# Patient Record
Sex: FEMALE | Race: WHITE | NOT HISPANIC OR LATINO | Employment: FULL TIME | URBAN - METROPOLITAN AREA
[De-identification: names, ages, dates, MRNs, and addresses within clinical notes are randomized per-mention and may not be internally consistent; named-entity substitution may affect disease eponyms.]

---

## 2024-08-19 ENCOUNTER — HOSPITAL ENCOUNTER (OUTPATIENT)
Dept: MAMMOGRAPHY | Facility: HOSPITAL | Age: 44
Discharge: HOME | End: 2024-08-19
Admitting: OBSTETRICS & GYNECOLOGY
Payer: COMMERCIAL

## 2024-08-19 DIAGNOSIS — Z12.31 VISIT FOR SCREENING MAMMOGRAM: ICD-10-CM

## 2024-08-19 LAB
NCCN CRITERIA FLAG: NORMAL
TYRER CUZICK SCORE: 13.1

## 2024-08-19 PROCEDURE — 77067 SCR MAMMO BI INCL CAD: CPT

## 2024-08-19 PROCEDURE — 77063 BREAST TOMOSYNTHESIS BI: CPT

## 2024-08-27 ENCOUNTER — LAB (OUTPATIENT)
Dept: LAB | Facility: HOSPITAL | Age: 44
End: 2024-08-27
Payer: COMMERCIAL

## 2024-08-27 DIAGNOSIS — R73.01 IMPAIRED FASTING GLUCOSE: Chronic | ICD-10-CM

## 2024-08-27 DIAGNOSIS — E78.00 PURE HYPERCHOLESTEROLEMIA: Chronic | ICD-10-CM

## 2024-08-27 PROCEDURE — 80061 LIPID PANEL: CPT

## 2024-08-27 PROCEDURE — 83036 HEMOGLOBIN GLYCOSYLATED A1C: CPT

## 2024-08-28 LAB
CHOLEST SERPL-MCNC: 193 MG/DL (ref 0–200)
HBA1C MFR BLD: 5.3 % (ref 4.8–5.6)
HDLC SERPL-MCNC: 64 MG/DL (ref 40–60)
LDLC SERPL CALC-MCNC: 108 MG/DL (ref 0–100)
LDLC/HDLC SERPL: 1.65 {RATIO}
TRIGL SERPL-MCNC: 117 MG/DL (ref 0–150)
VLDLC SERPL-MCNC: 21 MG/DL (ref 5–40)

## 2024-09-08 NOTE — PROGRESS NOTES
"Chief Complaint   Patient presents with    Hyperlipidemia    Impaired fasting glucose       History of Present Illness  44 y.o.  female presents for f/u on sugars and cholesterol.    Inquiring about repeat abnl mammograms and need for dx mammograms. Denies FH of breast CA.    Inquiring about menopause and HRT.  Currently doing well with OCPs with history of cysts.  Reports that mother went through early menopause at age 45.  Likely exacerbated by stress.    Patient reports feeling very hot at nighttime.    Review of Systems  Denies CP, SOB, palpitations. ROS (+) for nighttime heat, no sweating. All other ROS reviewed and negative.    Current Outpatient Medications:     albuterol sulfate  (90 Base) MCG/ACT inhaler prn    cetirizine (zyrTEC) 10 MG QD    desogestrel-ethinyl estradiol (Viorele) 0.15-0.02/0.01 MG (21/5) QD    VITALS:  /76   Pulse 51   Ht 163.2 cm (64.25\")   Wt 73.8 kg (162 lb 9.6 oz)   LMP 07/29/2024 Comment: denies pregnancy  SpO2 98%   BMI 27.69 kg/m²     Physical Exam  Vitals and nursing note reviewed.   Constitutional:       General: She is not in acute distress.     Appearance: Normal appearance. She is not ill-appearing.   Eyes:      Extraocular Movements: Extraocular movements intact.      Conjunctiva/sclera: Conjunctivae normal.   Pulmonary:      Effort: Pulmonary effort is normal. No respiratory distress.   Neurological:      Mental Status: She is alert. Mental status is at baseline.   Psychiatric:         Mood and Affect: Mood normal.         Behavior: Behavior normal.         LABS  Results for orders placed or performed in visit on 08/27/24   Lipid Panel    Specimen: Blood   Result Value Ref Range    Total Cholesterol 193 0 - 200 mg/dL    Triglycerides 117 0 - 150 mg/dL    HDL Cholesterol 64 (H) 40 - 60 mg/dL    LDL Cholesterol  108 (H) 0 - 100 mg/dL    VLDL Cholesterol 21 5 - 40 mg/dL    LDL/HDL Ratio 1.65    Hemoglobin A1c    Specimen: Blood   Result Value Ref " Range    Hemoglobin A1C 5.30 4.80 - 5.60 %     4/30/24 A1C 5.6,     8/19/24 mammo -fibroglandular breast tissue, asymmetry lateral right breast    ASSESSMENT/PLAN    Diagnoses and all orders for this visit:    1. Impaired fasting glucose (Primary)  Assessment & Plan:  BG control stable/good with A1C 5.3; will repeat with next PE in 7 mos      2. Hyperlipidemia  Assessment & Plan:   Lipids improved, back at goal with ; goal LDL < 130, ideally < 100; no meds      3. Vaccine counseling  Comments:  rec flu vacc and COVID19 vacc in Sept/Oct    4. Abnormal mammogram  Comments:  reviewed findings; pt recommended to obtain f/u views    5. Encounter for medication counseling  Comments:  note mother had early menopause at 45; pt asx except feeling hot at nighttime; discussed could d/c OCP x 1 month and then check labs; cont OCP for now        FOLLOW-UP  Health maintenance -  rec COVID19 vacc and flu vacc - pt states she will get both at school; for breast cancer screening, with no family history and no other significant risk, she could consider obtaining them every 2 years  RTC prn; next PE scheduled 5/5/25; fasting labs prior to appt (CBC, CMP, TSH, lipids, UA/micr, A1C) + FSH and estradiol if patient requests and stopped OCP x 1 month (with other protection recommended)    Electronically signed by:    Virgie Johnson MD, FACP  09/10/2024    EMR Dragon/Transcription Utilization  Please note that portions of this note were completed with a voice recognition program.

## 2024-09-10 ENCOUNTER — OFFICE VISIT (OUTPATIENT)
Dept: INTERNAL MEDICINE | Facility: CLINIC | Age: 44
End: 2024-09-10
Payer: COMMERCIAL

## 2024-09-10 VITALS
DIASTOLIC BLOOD PRESSURE: 76 MMHG | HEIGHT: 64 IN | SYSTOLIC BLOOD PRESSURE: 110 MMHG | BODY MASS INDEX: 27.76 KG/M2 | OXYGEN SATURATION: 98 % | HEART RATE: 51 BPM | WEIGHT: 162.6 LBS

## 2024-09-10 DIAGNOSIS — R92.8 ABNORMAL MAMMOGRAM: ICD-10-CM

## 2024-09-10 DIAGNOSIS — Z71.85 VACCINE COUNSELING: ICD-10-CM

## 2024-09-10 DIAGNOSIS — E78.00 PURE HYPERCHOLESTEROLEMIA: Chronic | ICD-10-CM

## 2024-09-10 DIAGNOSIS — Z71.89 ENCOUNTER FOR MEDICATION COUNSELING: ICD-10-CM

## 2024-09-10 DIAGNOSIS — R73.01 IMPAIRED FASTING GLUCOSE: Primary | Chronic | ICD-10-CM

## 2024-09-10 PROCEDURE — 99214 OFFICE O/P EST MOD 30 MIN: CPT | Performed by: INTERNAL MEDICINE

## 2024-09-17 ENCOUNTER — HOSPITAL ENCOUNTER (OUTPATIENT)
Facility: HOSPITAL | Age: 44
Discharge: HOME | End: 2024-09-17
Admitting: RADIOLOGY
Payer: COMMERCIAL

## 2024-09-17 DIAGNOSIS — R92.8 ABNORMAL MAMMOGRAM: ICD-10-CM

## 2024-09-17 PROCEDURE — G0279 TOMOSYNTHESIS, MAMMO: HCPCS

## 2024-09-17 PROCEDURE — 77065 DX MAMMO INCL CAD UNI: CPT | Performed by: RADIOLOGY

## 2024-09-17 PROCEDURE — 77065 DX MAMMO INCL CAD UNI: CPT

## 2024-09-17 PROCEDURE — 77061 BREAST TOMOSYNTHESIS UNI: CPT | Performed by: RADIOLOGY

## 2024-11-25 RX ORDER — DESOGESTREL AND ETHINYL ESTRADIOL AND ETHINYL ESTRADIOL 21-5 (28)
1 KIT ORAL DAILY
Qty: 28 TABLET | OUTPATIENT
Start: 2024-11-25

## 2025-04-10 DIAGNOSIS — R73.01 IMPAIRED FASTING GLUCOSE: Chronic | ICD-10-CM

## 2025-04-10 DIAGNOSIS — Z00.00 PE (PHYSICAL EXAM), ROUTINE: Primary | Chronic | ICD-10-CM

## 2025-04-25 PROBLEM — I83.93 SPIDER VEINS OF BOTH LOWER EXTREMITIES: Status: ACTIVE | Noted: 2025-04-25

## 2025-04-25 PROBLEM — L91.8 SKIN TAG: Status: ACTIVE | Noted: 2025-04-25

## 2025-04-30 ENCOUNTER — LAB (OUTPATIENT)
Dept: LAB | Facility: HOSPITAL | Age: 45
End: 2025-04-30
Payer: COMMERCIAL

## 2025-04-30 DIAGNOSIS — Z00.00 PE (PHYSICAL EXAM), ROUTINE: ICD-10-CM

## 2025-04-30 DIAGNOSIS — R73.01 IMPAIRED FASTING GLUCOSE: Chronic | ICD-10-CM

## 2025-04-30 LAB
ALBUMIN SERPL-MCNC: 4.2 G/DL (ref 3.5–5.2)
ALBUMIN/GLOB SERPL: 1.6 G/DL
ALP SERPL-CCNC: 64 U/L (ref 39–117)
ALT SERPL W P-5'-P-CCNC: 17 U/L (ref 1–33)
ANION GAP SERPL CALCULATED.3IONS-SCNC: 11.3 MMOL/L (ref 5–15)
AST SERPL-CCNC: 25 U/L (ref 1–32)
BACTERIA UR QL AUTO: ABNORMAL /HPF
BASOPHILS # BLD AUTO: 0.04 10*3/MM3 (ref 0–0.2)
BASOPHILS NFR BLD AUTO: 0.9 % (ref 0–1.5)
BILIRUB SERPL-MCNC: 0.3 MG/DL (ref 0–1.2)
BILIRUB UR QL STRIP: NEGATIVE
BUN SERPL-MCNC: 13 MG/DL (ref 6–20)
BUN/CREAT SERPL: 16.5 (ref 7–25)
CALCIUM SPEC-SCNC: 9.4 MG/DL (ref 8.6–10.5)
CHLORIDE SERPL-SCNC: 104 MMOL/L (ref 98–107)
CHOLEST SERPL-MCNC: 235 MG/DL (ref 0–200)
CLARITY UR: CLEAR
CO2 SERPL-SCNC: 23.7 MMOL/L (ref 22–29)
COLOR UR: YELLOW
CREAT SERPL-MCNC: 0.79 MG/DL (ref 0.57–1)
DEPRECATED RDW RBC AUTO: 36.6 FL (ref 37–54)
EGFRCR SERPLBLD CKD-EPI 2021: 94.7 ML/MIN/1.73
EOSINOPHIL # BLD AUTO: 0.18 10*3/MM3 (ref 0–0.4)
EOSINOPHIL NFR BLD AUTO: 4.2 % (ref 0.3–6.2)
ERYTHROCYTE [DISTWIDTH] IN BLOOD BY AUTOMATED COUNT: 12 % (ref 12.3–15.4)
GLOBULIN UR ELPH-MCNC: 2.7 GM/DL
GLUCOSE SERPL-MCNC: 98 MG/DL (ref 65–99)
GLUCOSE UR STRIP-MCNC: NEGATIVE MG/DL
HBA1C MFR BLD: 5.7 % (ref 4.8–5.6)
HCT VFR BLD AUTO: 43.5 % (ref 34–46.6)
HDLC SERPL-MCNC: 79 MG/DL (ref 40–60)
HGB BLD-MCNC: 14.6 G/DL (ref 12–15.9)
HGB UR QL STRIP.AUTO: NEGATIVE
HYALINE CASTS UR QL AUTO: ABNORMAL /LPF
IMM GRANULOCYTES # BLD AUTO: 0.01 10*3/MM3 (ref 0–0.05)
IMM GRANULOCYTES NFR BLD AUTO: 0.2 % (ref 0–0.5)
KETONES UR QL STRIP: NEGATIVE
LDLC SERPL CALC-MCNC: 137 MG/DL (ref 0–100)
LDLC/HDLC SERPL: 1.69 {RATIO}
LEUKOCYTE ESTERASE UR QL STRIP.AUTO: ABNORMAL
LYMPHOCYTES # BLD AUTO: 1.43 10*3/MM3 (ref 0.7–3.1)
LYMPHOCYTES NFR BLD AUTO: 33.6 % (ref 19.6–45.3)
MCH RBC QN AUTO: 28.6 PG (ref 26.6–33)
MCHC RBC AUTO-ENTMCNC: 33.6 G/DL (ref 31.5–35.7)
MCV RBC AUTO: 85.1 FL (ref 79–97)
MONOCYTES # BLD AUTO: 0.47 10*3/MM3 (ref 0.1–0.9)
MONOCYTES NFR BLD AUTO: 11 % (ref 5–12)
NEUTROPHILS NFR BLD AUTO: 2.13 10*3/MM3 (ref 1.7–7)
NEUTROPHILS NFR BLD AUTO: 50.1 % (ref 42.7–76)
NITRITE UR QL STRIP: NEGATIVE
NRBC BLD AUTO-RTO: 0 /100 WBC (ref 0–0.2)
PH UR STRIP.AUTO: 6.5 [PH] (ref 5–8)
PLATELET # BLD AUTO: 246 10*3/MM3 (ref 140–450)
PMV BLD AUTO: 11 FL (ref 6–12)
POTASSIUM SERPL-SCNC: 4.3 MMOL/L (ref 3.5–5.2)
PROT SERPL-MCNC: 6.9 G/DL (ref 6–8.5)
PROT UR QL STRIP: NEGATIVE
RBC # BLD AUTO: 5.11 10*6/MM3 (ref 3.77–5.28)
RBC # UR STRIP: ABNORMAL /HPF
REF LAB TEST METHOD: ABNORMAL
SODIUM SERPL-SCNC: 139 MMOL/L (ref 136–145)
SP GR UR STRIP: 1.01 (ref 1–1.03)
SQUAMOUS #/AREA URNS HPF: ABNORMAL /HPF
TRIGL SERPL-MCNC: 112 MG/DL (ref 0–150)
TSH SERPL DL<=0.05 MIU/L-ACNC: 3.54 UIU/ML (ref 0.27–4.2)
UROBILINOGEN UR QL STRIP: ABNORMAL
VLDLC SERPL-MCNC: 19 MG/DL (ref 5–40)
WBC # UR STRIP: ABNORMAL /HPF
WBC NRBC COR # BLD AUTO: 4.26 10*3/MM3 (ref 3.4–10.8)

## 2025-04-30 PROCEDURE — 80050 GENERAL HEALTH PANEL: CPT

## 2025-04-30 PROCEDURE — 81001 URINALYSIS AUTO W/SCOPE: CPT

## 2025-04-30 PROCEDURE — 80061 LIPID PANEL: CPT

## 2025-04-30 PROCEDURE — 83036 HEMOGLOBIN GLYCOSYLATED A1C: CPT

## 2025-05-03 NOTE — PROGRESS NOTES
"Chief Complaint   Patient presents with    Annual Exam    Hyperlipidemia    Impaired fasting glucose       History of Present Illness  45 y.o.  female presents for updated phys examination and f/u on sugars and cholesterol.    Reports mother had early menopause in early 40s. Patient has some breakthrough spotting on OCP. Also notes weight gain.    Sees retinal specialist for monitoring of choroidal drusen; no loss of vision. Also sees regular eye doctor.    Review of Systems  Denies headaches, visual changes, CP, palpitations, SOB, cough, abd pain, n/v/d, difficulty with urination, numbness/tingling, falls, mood changes, lightheadedness, hearing changes, rashes.  Denies vaginal discharge or bleeding (reg periods with OCP with occ spotting) or breast concerns.   All other ROS reviewed and negative.    Current Outpatient Medications:     albuterol sulfate  (90 Base) MCG/ACT inhaler prn    cetirizine 10 MG QD    desogestrel-ethinyl estradiol (Viorele) 0.15-0.02/0.01 MG (21/5) QD    VITALS:  /76   Pulse 78   Ht 163.2 cm (64.25\")   Wt 77 kg (169 lb 12.8 oz)   SpO2 98%   BMI 28.92 kg/m²     Physical Exam  Vitals and nursing note reviewed.   Constitutional:       General: She is not in acute distress.     Appearance: Normal appearance. She is well-developed. She is not ill-appearing.      Comments: Up 7 lbs over last 8 mos   HENT:      Head: Normocephalic.      Right Ear: Tympanic membrane, ear canal and external ear normal.      Left Ear: Tympanic membrane, ear canal and external ear normal.      Nose: Nose normal.   Eyes:      Extraocular Movements: Extraocular movements intact.      Conjunctiva/sclera: Conjunctivae normal.      Pupils: Pupils are equal, round, and reactive to light.   Neck:      Vascular: No carotid bruit (bilaterally).   Cardiovascular:      Rate and Rhythm: Normal rate and regular rhythm.      Heart sounds: Normal heart sounds.   Pulmonary:      Effort: Pulmonary effort is " normal. No respiratory distress.      Breath sounds: Normal breath sounds. No wheezing, rhonchi or rales.   Abdominal:      General: Bowel sounds are normal. There is no distension.      Palpations: Abdomen is soft. There is no mass.      Tenderness: There is no abdominal tenderness.   Genitourinary:     Comments: Breast/pelvic exams deferred to GYN    Musculoskeletal:      Cervical back: Normal range of motion and neck supple.      Right lower leg: No edema.      Left lower leg: No edema.   Lymphadenopathy:      Cervical: No cervical adenopathy.   Skin:     General: Skin is warm and dry.      Findings: No rash.   Neurological:      Mental Status: She is alert and oriented to person, place, and time. Mental status is at baseline.      Gait: Gait normal.   Psychiatric:         Mood and Affect: Mood normal.         Behavior: Behavior normal.         LABS  Results for orders placed or performed in visit on 04/30/25   Comprehensive Metabolic Panel    Collection Time: 04/30/25  8:16 AM    Specimen: Blood   Result Value Ref Range    Glucose 98 65 - 99 mg/dL    BUN 13 6 - 20 mg/dL    Creatinine 0.79 0.57 - 1.00 mg/dL    Sodium 139 136 - 145 mmol/L    Potassium 4.3 3.5 - 5.2 mmol/L    Chloride 104 98 - 107 mmol/L    CO2 23.7 22.0 - 29.0 mmol/L    Calcium 9.4 8.6 - 10.5 mg/dL    Total Protein 6.9 6.0 - 8.5 g/dL    Albumin 4.2 3.5 - 5.2 g/dL    ALT (SGPT) 17 1 - 33 U/L    AST (SGOT) 25 1 - 32 U/L    Alkaline Phosphatase 64 39 - 117 U/L    Total Bilirubin 0.3 0.0 - 1.2 mg/dL    Globulin 2.7 gm/dL    A/G Ratio 1.6 g/dL    BUN/Creatinine Ratio 16.5 7.0 - 25.0    Anion Gap 11.3 5.0 - 15.0 mmol/L    eGFR 94.7 >60.0 mL/min/1.73   Lipid Panel    Collection Time: 04/30/25  8:16 AM    Specimen: Blood   Result Value Ref Range    Total Cholesterol 235 (H) 0 - 200 mg/dL    Triglycerides 112 0 - 150 mg/dL    HDL Cholesterol 79 (H) 40 - 60 mg/dL    LDL Cholesterol  137 (H) 0 - 100 mg/dL    VLDL Cholesterol 19 5 - 40 mg/dL    LDL/HDL Ratio  1.69    TSH    Collection Time: 04/30/25  8:16 AM    Specimen: Blood   Result Value Ref Range    TSH 3.540 0.270 - 4.200 uIU/mL   Hemoglobin A1c    Collection Time: 04/30/25  8:16 AM    Specimen: Blood   Result Value Ref Range    Hemoglobin A1C 5.70 (H) 4.80 - 5.60 %   CBC Auto Differential    Collection Time: 04/30/25  8:16 AM    Specimen: Blood   Result Value Ref Range    WBC 4.26 3.40 - 10.80 10*3/mm3    RBC 5.11 3.77 - 5.28 10*6/mm3    Hemoglobin 14.6 12.0 - 15.9 g/dL    Hematocrit 43.5 34.0 - 46.6 %    MCV 85.1 79.0 - 97.0 fL    MCH 28.6 26.6 - 33.0 pg    MCHC 33.6 31.5 - 35.7 g/dL    RDW 12.0 (L) 12.3 - 15.4 %    RDW-SD 36.6 (L) 37.0 - 54.0 fl    MPV 11.0 6.0 - 12.0 fL    Platelets 246 140 - 450 10*3/mm3    Neutrophil % 50.1 42.7 - 76.0 %    Lymphocyte % 33.6 19.6 - 45.3 %    Monocyte % 11.0 5.0 - 12.0 %    Eosinophil % 4.2 0.3 - 6.2 %    Basophil % 0.9 0.0 - 1.5 %    Immature Grans % 0.2 0.0 - 0.5 %    Neutrophils, Absolute 2.13 1.70 - 7.00 10*3/mm3    Lymphocytes, Absolute 1.43 0.70 - 3.10 10*3/mm3    Monocytes, Absolute 0.47 0.10 - 0.90 10*3/mm3    Eosinophils, Absolute 0.18 0.00 - 0.40 10*3/mm3    Basophils, Absolute 0.04 0.00 - 0.20 10*3/mm3    Immature Grans, Absolute 0.01 0.00 - 0.05 10*3/mm3    nRBC 0.0 0.0 - 0.2 /100 WBC   Urinalysis without microscopic (no culture) - Urine, Clean Catch    Collection Time: 04/30/25  8:16 AM    Specimen: Urine, Clean Catch   Result Value Ref Range    Color, UA Yellow Yellow, Straw    Appearance, UA Clear Clear    pH, UA 6.5 5.0 - 8.0    Specific Gravity, UA 1.010 1.005 - 1.030    Glucose, UA Negative Negative    Ketones, UA Negative Negative    Bilirubin, UA Negative Negative    Blood, UA Negative Negative    Protein, UA Negative Negative    Leuk Esterase, UA Trace (A) Negative    Nitrite, UA Negative Negative    Urobilinogen, UA 0.2 E.U./dL 0.2 - 1.0 E.U./dL   Urinalysis, Microscopic Only - Urine, Clean Catch    Collection Time: 04/30/25  8:16 AM    Specimen: Urine,  Clean Catch   Result Value Ref Range    RBC, UA None Seen None Seen, 0-2 /HPF    WBC, UA 0-2 None Seen, 0-2 /HPF    Bacteria, UA Trace (A) None Seen /HPF    Squamous Epithelial Cells, UA 3-6 (A) None Seen, 0-2 /HPF    Hyaline Casts, UA None Seen None Seen /LPF    Methodology Automated Microscopy      8/27/24 A1C 5.3,       ECG 12 Lead    Date/Time: 5/5/2025 9:03 AM  Performed by: Virgie Johnson MD    Authorized by: Virgie Johnson MD  Comparison: compared with previous ECG from 5/2/2024  Similar to previous ECG  Rhythm: sinus rhythm and sinus arrhythmia  Rate: normal  BPM: 60  Conduction: conduction normal  ST Segments: ST segments normal  T Waves: T waves normal  QRS axis: normal  Clinical impression comment: stable EKG        ASSESSMENT/PLAN    Diagnoses and all orders for this visit:    1. PE (physical exam), routine (Primary)  Assessment & Plan:  Health maintenance - refuses COVID19 vacc; flu vacc at school; Prev 20 8/22; Td 8/20 (next Tdap due 2030); HAV done; mammo 8/19/24, f/u R 9/24, resume screening; GYN w/ Dr. Mann pending 5/6/25 (Pap 4/22 - advised she needs Pap this year) - pt is going to reschedule since menstrual cycle is scheduled to begin tomorrow; DEXA with menopause; screening colonosc at age 46; eye exam UTD with regular opto and retinal specialist for choroidal drusen; dental exam q6 mos; (+) seat belt use    Consultants:  Patient Care Team:  Virgie Johnson MD as PCP - General (Internal Medicine)  Zachary Schmidt MD as Consulting Physician (Allergy)  Jessica Linares PA as Physician Assistant (Dermatology)  Michele Cruz MD as Consulting Physician (Retina Ophthalmology)  Madisyn Mann MD as Consulting Physician (Obstetrics and Gynecology)  Yola Merrill PA as Physician Assistant (Dermatology)        2. Hyperlipidemia  Assessment & Plan:  Lipids worsened with ; decrease saturated fats and cholesterol in the diet; repeat lipids in 6 mos      Orders:  -      Lipid Panel; Future  -     ECG 12 Lead    3. Impaired fasting glucose  Assessment & Plan:  BG control bord with A1C 5.7; encouraged reg phys activity to decr insulin resistance, moderation in unhealthy starches/sweets; f/u A1C in 6 mos      Orders:  -     Hemoglobin A1c; Future    4. Overweight (BMI 25.0-29.9)  Assessment & Plan:  Patient's (Body mass index is 28.92 kg/m².) with 7 lbs wt gain over the last 8mos and 25 lbs over the last 10 yrs; rec evaluating daily caloric intake (probably should be 1150-8957 lety/day), minimizing snacking and beverages between meals, not eating late at nighttime, and maintaining balance of protein with carbs with each meal or snack. Also reviewed pros/cons and options with intermittent fasting. Also discussed importance of consistent exercise, max intensity of exercise. Advised more cardio type of exercise initially and then balance with resistance exercises to help maintain successful wt loss. Reviewed role of mediations, incl benefits, risks, side effects. Would need exercise plan and would need to look at insurance coverage. She would be covered with BMI > 27 (28.92) with co-morbidity (hyperlipidemia)        Time Documentation    Counseled patient  I spent  61 minutes  face to face and 10 minutes non-face to face on today's office visit. Time was spent reviewing patient's previous notes, lab results, test results, vitals, and/or other records as well as examining the patient, ordering tests/medicines/procedures, providing counseling, coordinating care, answering questions, discussing evaluation and treatment plans, and writing this note.     Time spent performing and reading the EKG was not included in the time reported for the E/M service for this visit.    I spent 20 minutes on the separately reported service of [id number]. This time is not included in the time used to support the E/M service also reported today.     Total time: 71 minutes  (Level 5 40 minutes)     FOLLOW-UP  RTC 4  mos with A1C, lipids (escribed)    Electronically signed by:    Virgie Johnson MD, FACP  05/05/2025

## 2025-05-03 NOTE — ASSESSMENT & PLAN NOTE
Lipids worsened with ; decrease saturated fats and cholesterol in the diet; repeat lipids in 6 mos

## 2025-05-03 NOTE — ASSESSMENT & PLAN NOTE
Health maintenance - refuses COVID19 vacc; flu vacc at school; Prev 20 8/22; Td 8/20 (next Tdap due 2030); HAV done; mammo 8/19/24, f/u R 9/24, resume screening; GYN w/ Dr. Mann pending 5/6/25 (Pap 4/22 - advised she needs Pap this year) - pt is going to reschedule since menstrual cycle is scheduled to begin tomorrow; DEXA with menopause; screening colonosc at age 46; eye exam UTD with regular opto and retinal specialist for choroidal drusen; dental exam q6 mos; (+) seat belt use    Consultants:  Patient Care Team:  Virgie Johnson MD as PCP - General (Internal Medicine)  Zachary Schmidt MD as Consulting Physician (Allergy)  Jessica Linares PA as Physician Assistant (Dermatology)  Michele Cruz MD as Consulting Physician (Retina Ophthalmology)  Madisyn Mann MD as Consulting Physician (Obstetrics and Gynecology)  Yola Merrill PA as Physician Assistant (Dermatology)

## 2025-05-05 ENCOUNTER — OFFICE VISIT (OUTPATIENT)
Dept: INTERNAL MEDICINE | Facility: CLINIC | Age: 45
End: 2025-05-05
Payer: COMMERCIAL

## 2025-05-05 ENCOUNTER — TELEPHONE (OUTPATIENT)
Dept: OBSTETRICS AND GYNECOLOGY | Facility: CLINIC | Age: 45
End: 2025-05-05
Payer: COMMERCIAL

## 2025-05-05 VITALS
HEIGHT: 64 IN | OXYGEN SATURATION: 98 % | SYSTOLIC BLOOD PRESSURE: 128 MMHG | HEART RATE: 78 BPM | DIASTOLIC BLOOD PRESSURE: 76 MMHG | WEIGHT: 169.8 LBS | BODY MASS INDEX: 28.99 KG/M2

## 2025-05-05 DIAGNOSIS — R73.01 IMPAIRED FASTING GLUCOSE: Chronic | ICD-10-CM

## 2025-05-05 DIAGNOSIS — Z00.00 PE (PHYSICAL EXAM), ROUTINE: Primary | ICD-10-CM

## 2025-05-05 DIAGNOSIS — E78.00 PURE HYPERCHOLESTEROLEMIA: Chronic | ICD-10-CM

## 2025-05-05 DIAGNOSIS — E66.3 OVERWEIGHT (BMI 25.0-29.9): Chronic | ICD-10-CM

## 2025-05-05 NOTE — TELEPHONE ENCOUNTER
"  Hub staff attempted to follow warm transfer process and was unsuccessful     Caller: Shelly Fall \"Christel\"    Relationship to patient: Self    Best call back number: [phone] (home)       Patient is needing: A CALL BACK ABOUT TOMORROW'S APPT, HAS ANNUAL AND AND ULTRASOUND APPT WITH DR TALBERT. SHE SHOULD START HER PERIOD TOMORROW, NORMALLY IT IS PRETTY LIGHT. SHOULD SHE RESCHEDULE?          "

## 2025-05-06 NOTE — ASSESSMENT & PLAN NOTE
Patient's (Body mass index is 28.92 kg/m².) with 7 lbs wt gain over the last 8mos and 25 lbs over the last 10 yrs; rec evaluating daily caloric intake (probably should be 1468-1581 lety/day), minimizing snacking and beverages between meals, not eating late at nighttime, and maintaining balance of protein with carbs with each meal or snack. Also reviewed pros/cons and options with intermittent fasting. Also discussed importance of consistent exercise, max intensity of exercise. Advised more cardio type of exercise initially and then balance with resistance exercises to help maintain successful wt loss. Reviewed role of mediations, incl benefits, risks, side effects. Would need exercise plan and would need to look at insurance coverage. She would be covered with BMI > 27 (28.92) with co-morbidity (hyperlipidemia); > 20 minutes spent counseling

## 2025-05-07 RX ORDER — DESOGESTREL AND ETHINYL ESTRADIOL AND ETHINYL ESTRADIOL 21-5 (28)
1 KIT ORAL DAILY
Qty: 84 TABLET | Refills: 3 | Status: SHIPPED | OUTPATIENT
Start: 2025-05-07

## 2025-06-10 DIAGNOSIS — D21.9 FIBROIDS: Primary | ICD-10-CM

## 2025-06-11 ENCOUNTER — OFFICE VISIT (OUTPATIENT)
Dept: OBSTETRICS AND GYNECOLOGY | Facility: CLINIC | Age: 45
End: 2025-06-11
Payer: COMMERCIAL

## 2025-06-11 ENCOUNTER — TELEPHONE (OUTPATIENT)
Dept: OBSTETRICS AND GYNECOLOGY | Facility: CLINIC | Age: 45
End: 2025-06-11

## 2025-06-11 VITALS
SYSTOLIC BLOOD PRESSURE: 122 MMHG | BODY MASS INDEX: 29.57 KG/M2 | WEIGHT: 173.2 LBS | DIASTOLIC BLOOD PRESSURE: 80 MMHG | HEIGHT: 64 IN

## 2025-06-11 DIAGNOSIS — Z01.419 WOMEN'S ANNUAL ROUTINE GYNECOLOGICAL EXAMINATION: Primary | ICD-10-CM

## 2025-06-11 DIAGNOSIS — N83.202 CYST OF LEFT OVARY: ICD-10-CM

## 2025-06-11 DIAGNOSIS — D25.1 INTRAMURAL LEIOMYOMA OF UTERUS: ICD-10-CM

## 2025-06-11 DIAGNOSIS — Z12.39 ENCOUNTER FOR BREAST CANCER SCREENING USING NON-MAMMOGRAM MODALITY: ICD-10-CM

## 2025-06-11 DIAGNOSIS — E66.3 OVERWEIGHT (BMI 25.0-29.9): ICD-10-CM

## 2025-06-11 DIAGNOSIS — Z30.41 ENCOUNTER FOR SURVEILLANCE OF CONTRACEPTIVE PILLS: ICD-10-CM

## 2025-06-11 DIAGNOSIS — E78.00 HYPERCHOLESTEROLEMIA: ICD-10-CM

## 2025-06-11 RX ORDER — SEMAGLUTIDE 0.25 MG/.5ML
0.25 INJECTION, SOLUTION SUBCUTANEOUS WEEKLY
Qty: 2 ML | Refills: 2 | Status: SHIPPED | OUTPATIENT
Start: 2025-06-11

## 2025-06-11 NOTE — TELEPHONE ENCOUNTER
(Key: [id number])  Rx #: [id number]  Wegovy 0.25MG/0.5ML auto-injectors [facility] Electronic PA Form Pending Message from plan: Your PA request has been approved. Additional information will be provided in the approval communication. (Message 1149). Authorization Expiration Date: January 11, 2026.  I called the pharmacy and the cost is $1300 and she is trying to get a discount card.

## 2025-06-11 NOTE — PROGRESS NOTES
Gynecologic Annual Exam Note          GYN Annual Exam     Gynecologic Exam        Subjective     HPI  Shelly Fall is a 45 y.o. female, , who presents for annual well woman exam as a established patient. There were no changes to her medical or surgical history since her last visit..  Patient's last menstrual period was 2025 (approximate).  Her periods occur every 28 days, lasting 4-5 days.  The flow is light. She reports dysmenorrhea is mild occurring throughout cycle. Marital Status: . She is sexually active. She has not had new partners.. STD testing recommendations have been explained to the patient and she declines STD testing.    The patient would like to discuss the following complaints today: none    Additional OB/GYN History   contraceptive methods: OCP (estrogen/progesterone)  Desires to: continue contraception  History of migraines: no    Last Pap : 22. Result: negative. HPV: not done.   Last Completed Pap Smear    This patient has no relevant Health Maintenance data.       History of abnormal Pap smear: no  Family history of uterine, colon, breast, or ovarian cancer: no  Performs monthly Self-Breast Exam: yes  Last mammogram: 24. Done at . There is a copy in the chart.    Last Completed Mammogram            Upcoming       MAMMOGRAM (Every 2 Years) Next due on 2024  Mammo Diagnostic Digital Tomosynthesis Right With CAD    2024  Mammo Screening Digital Tomosynthesis Bilateral With CAD    2023  Mammo Screening Digital Tomosynthesis Bilateral With CAD    2022  Mammo Diagnostic Digital Tomosynthesis Left With CAD    2022  Mammo Screening Digital Tomosynthesis Bilateral With CAD     Only the first 5 history entries have been loaded, but more history exists.                          Colonoscopy: has never had a colonoscopy or cologuard  Exercises Regularly: yes  Feelings of Anxiety or Depression: no  Tobacco Usage?:  No       Current Outpatient Medications:     albuterol sulfate  (90 Base) MCG/ACT inhaler, Inhale 2 puffs Every 6 (Six) Hours As Needed for Wheezing or Shortness of Air., Disp: 18 g, Rfl: 0    cetirizine (zyrTEC) 10 MG tablet, Take 1 tablet by mouth Daily., Disp: , Rfl:     Viorele 0.15-0.02/0.01 MG (/) per tablet, TAKE 1 TABLET BY MOUTH DAILY, Disp: 84 tablet, Rfl: 3     Patient is requesting refills of ocp's.    OB History          0    Para   0    Term   0       0    AB   0    Living   0         SAB   0    IAB   0    Ectopic   0    Molar   0    Multiple   0    Live Births   0                Past Medical History:   Diagnosis Date    Female infertility     Hx of right calf tear     s/p PT    Hx of transvaginal ultrasound 2022    vaginal u/s (22): uterine fibroids x2; 5cm left ovarian simple cyst; GYN - Dr. Mann        Past Surgical History:   Procedure Laterality Date    DIAGNOSTIC LAPAROSCOPY  2022    s/p dx lap and laser ablation for endometriosis and symenorrhea (22); GYN - Dr. Mann    ENDOMETRIAL ABLATION  2022    LASIK Bilateral     incompletely successful per pt; -6.50 pre-op ([place])    TONSILLECTOMY      age 7       Health Maintenance   Topic Date Due    COVID-19 Vaccine (2024- season) 2024    PAP SMEAR  2025    Annual Gynecologic Pelvic and Breast Exam  2025    INFLUENZA VACCINE  2025    LIPID PANEL  2026    ANNUAL PHYSICAL  2026    MAMMOGRAM  2026    TDAP/TD VACCINES (3 - Td or Tdap) 2030    HEPATITIS C SCREENING  Completed    Pneumococcal Vaccine 0-49  Completed       The additional following portions of the patient's history were reviewed and updated as appropriate: allergies, current medications, past family history, past medical history, past social history, past surgical history, and problem list.    Review of Systems   Constitutional: Negative.    HENT: Negative.     Eyes:  "Negative.    Respiratory: Negative.     Cardiovascular: Negative.    Gastrointestinal: Negative.    Endocrine: Negative.    Genitourinary: Negative.    Musculoskeletal: Negative.    Skin: Negative.    Allergic/Immunologic: Negative.    Neurological: Negative.    Hematological: Negative.    Psychiatric/Behavioral: Negative.           I have reviewed and agree with the HPI, ROS, and historical information as entered above. Madisyn Mann MD          Objective   /80   Ht 162.6 cm (64\")   Wt 78.6 kg (173 lb 3.2 oz)   LMP 06/02/2025 (Approximate)   BMI 29.73 kg/m²     Physical Exam  Vitals and nursing note reviewed. Exam conducted with a chaperone present.   Constitutional:       Appearance: She is well-developed.   HENT:      Head: Normocephalic and atraumatic.   Neck:      Thyroid: No thyroid mass or thyromegaly.   Cardiovascular:      Rate and Rhythm: Normal rate and regular rhythm.      Heart sounds: No murmur heard.  Pulmonary:      Effort: Pulmonary effort is normal. No retractions.      Breath sounds: Normal breath sounds. No wheezing, rhonchi or rales.   Chest:      Chest wall: No mass or tenderness.   Breasts:     Right: Normal. No mass, nipple discharge, skin change or tenderness.      Left: Normal. No mass, nipple discharge, skin change or tenderness.   Abdominal:      General: Bowel sounds are normal.      Palpations: Abdomen is soft. Abdomen is not rigid. There is no mass.      Tenderness: There is no abdominal tenderness. There is no guarding.      Hernia: No hernia is present. There is no hernia in the left inguinal area.   Genitourinary:     Labia:         Right: No rash, tenderness or lesion.         Left: No rash, tenderness or lesion.       Vagina: Normal. No vaginal discharge or lesions.      Cervix: No cervical motion tenderness, discharge, lesion or cervical bleeding.      Uterus: Normal. Not enlarged, not fixed and not tender.       Adnexa:         Right: No mass or tenderness.          " Left: No mass or tenderness.        Rectum: No external hemorrhoid.   Musculoskeletal:      Cervical back: Normal range of motion. No muscular tenderness.   Neurological:      Mental Status: She is alert and oriented to person, place, and time.   Psychiatric:         Behavior: Behavior normal.            Assessment and Plan    Problem List Items Addressed This Visit          Genitourinary and Reproductive     Intramural leiomyoma of uterus     Other Visit Diagnoses         Women's annual routine gynecological examination    -  Primary    Relevant Orders    LIQUID-BASED PAP SMEAR WITH HPV GENOTYPING REGARDLESS OF INTERPRETATION (CROW,[facility],MAD)    US Non-ob Transvaginal      Encounter for breast cancer screening using non-mammogram modality          Encounter for surveillance of contraceptive pills          Cyst of left ovary                GYN annual well woman exam.   Pap guidelines reviewed.  Recommended use of Vitamin D replacement and getting adequate calcium in her diet. (1500mg)  Continue yearly mammography.  Reviewed self breast awareness.  Instructed to call with lumps, pain, or breast discharge.     Reviewed HPV guidelines.  Reviewed exercise as a preventative health measures.    Perimenopausal guidance.  Her mother went through menopause at her age.    Happy on current MARIELLE.  Will continue.  Stable leiomyoma on u/s.  New solid nodular area in ovary.  Repeat in 6 months.  Weight gain and high cholesterol.  She is interested in starting treatment.  Counseled on options  Wegovy sent in.  Return in about 6 months (around 12/11/2025) for US with Next Visit.     Madisyn Mann MD  06/11/2025

## 2025-06-12 ENCOUNTER — RESULTS FOLLOW-UP (OUTPATIENT)
Dept: OBSTETRICS AND GYNECOLOGY | Facility: CLINIC | Age: 45
End: 2025-06-12
Payer: COMMERCIAL

## 2025-06-12 LAB — REF LAB TEST METHOD: NORMAL

## 2025-06-17 ENCOUNTER — TELEPHONE (OUTPATIENT)
Dept: OBSTETRICS AND GYNECOLOGY | Facility: CLINIC | Age: 45
End: 2025-06-17
Payer: COMMERCIAL

## 2025-06-17 NOTE — TELEPHONE ENCOUNTER
I am calling the pt to see if she was able to get the Wegovy savings card. She has an [employer] account and did pay $1000. She may try to get a different plan that has better coverage of Wegovy.     Pt notified she will need to message when she is ready to titrate up to the next dose of Wegovy .50mg then 1mg and then 1.7 and 2.4mg. The last two are maintenance doses.

## 2025-08-07 ENCOUNTER — TELEPHONE (OUTPATIENT)
Dept: OBSTETRICS AND GYNECOLOGY | Facility: CLINIC | Age: 45
End: 2025-08-07
Payer: COMMERCIAL

## 2025-08-07 DIAGNOSIS — E66.3 OVERWEIGHT (BMI 25.0-29.9): Primary | ICD-10-CM

## 2025-08-07 DIAGNOSIS — E78.00 HYPERCHOLESTEROLEMIA: ICD-10-CM

## 2025-08-11 RX ORDER — SEMAGLUTIDE 1 MG/.5ML
1 INJECTION, SOLUTION SUBCUTANEOUS WEEKLY
Qty: 2 ML | Refills: 0 | Status: SHIPPED | OUTPATIENT
Start: 2025-08-11